# Patient Record
Sex: FEMALE | Race: OTHER | HISPANIC OR LATINO | URBAN - METROPOLITAN AREA
[De-identification: names, ages, dates, MRNs, and addresses within clinical notes are randomized per-mention and may not be internally consistent; named-entity substitution may affect disease eponyms.]

---

## 2018-05-04 ENCOUNTER — OUTPATIENT (OUTPATIENT)
Dept: OUTPATIENT SERVICES | Facility: HOSPITAL | Age: 63
LOS: 1 days | End: 2018-05-04
Payer: COMMERCIAL

## 2018-05-04 PROCEDURE — 71046 X-RAY EXAM CHEST 2 VIEWS: CPT | Mod: 26

## 2018-05-04 PROCEDURE — 71046 X-RAY EXAM CHEST 2 VIEWS: CPT

## 2023-01-02 ENCOUNTER — EMERGENCY (EMERGENCY)
Facility: HOSPITAL | Age: 68
LOS: 1 days | Discharge: HOME | End: 2023-01-02
Attending: EMERGENCY MEDICINE | Admitting: EMERGENCY MEDICINE
Payer: COMMERCIAL

## 2023-01-02 VITALS
SYSTOLIC BLOOD PRESSURE: 97 MMHG | RESPIRATION RATE: 18 BRPM | TEMPERATURE: 98 F | HEART RATE: 89 BPM | HEIGHT: 61 IN | OXYGEN SATURATION: 95 % | DIASTOLIC BLOOD PRESSURE: 62 MMHG | WEIGHT: 119.93 LBS

## 2023-01-02 VITALS
HEART RATE: 77 BPM | OXYGEN SATURATION: 94 % | DIASTOLIC BLOOD PRESSURE: 77 MMHG | RESPIRATION RATE: 17 BRPM | TEMPERATURE: 98 F | SYSTOLIC BLOOD PRESSURE: 130 MMHG

## 2023-01-02 LAB
ALBUMIN SERPL ELPH-MCNC: 3.5 G/DL — SIGNIFICANT CHANGE UP (ref 3.3–5)
ALP SERPL-CCNC: 107 U/L — SIGNIFICANT CHANGE UP (ref 40–120)
ALT FLD-CCNC: 8 U/L — LOW (ref 10–45)
ANION GAP SERPL CALC-SCNC: 11 MMOL/L — SIGNIFICANT CHANGE UP (ref 5–17)
AST SERPL-CCNC: 27 U/L — SIGNIFICANT CHANGE UP (ref 10–40)
BASOPHILS # BLD AUTO: 0.05 K/UL — SIGNIFICANT CHANGE UP (ref 0–0.2)
BASOPHILS NFR BLD AUTO: 0.5 % — SIGNIFICANT CHANGE UP (ref 0–2)
BILIRUB SERPL-MCNC: 0.5 MG/DL — SIGNIFICANT CHANGE UP (ref 0.2–1.2)
BUN SERPL-MCNC: 10 MG/DL — SIGNIFICANT CHANGE UP (ref 7–23)
CALCIUM SERPL-MCNC: 8.8 MG/DL — SIGNIFICANT CHANGE UP (ref 8.4–10.5)
CHLORIDE SERPL-SCNC: 102 MMOL/L — SIGNIFICANT CHANGE UP (ref 96–108)
CO2 SERPL-SCNC: 28 MMOL/L — SIGNIFICANT CHANGE UP (ref 22–31)
CREAT SERPL-MCNC: 0.66 MG/DL — SIGNIFICANT CHANGE UP (ref 0.5–1.3)
EGFR: 95 ML/MIN/1.73M2 — SIGNIFICANT CHANGE UP
EOSINOPHIL # BLD AUTO: 0.04 K/UL — SIGNIFICANT CHANGE UP (ref 0–0.5)
EOSINOPHIL NFR BLD AUTO: 0.4 % — SIGNIFICANT CHANGE UP (ref 0–6)
GLUCOSE SERPL-MCNC: 99 MG/DL — SIGNIFICANT CHANGE UP (ref 70–99)
HCT VFR BLD CALC: 44.3 % — SIGNIFICANT CHANGE UP (ref 34.5–45)
HGB BLD-MCNC: 14.8 G/DL — SIGNIFICANT CHANGE UP (ref 11.5–15.5)
IMM GRANULOCYTES NFR BLD AUTO: 0.2 % — SIGNIFICANT CHANGE UP (ref 0–0.9)
LYMPHOCYTES # BLD AUTO: 1.68 K/UL — SIGNIFICANT CHANGE UP (ref 1–3.3)
LYMPHOCYTES # BLD AUTO: 17.5 % — SIGNIFICANT CHANGE UP (ref 13–44)
MCHC RBC-ENTMCNC: 33.4 GM/DL — SIGNIFICANT CHANGE UP (ref 32–36)
MCHC RBC-ENTMCNC: 33.8 PG — SIGNIFICANT CHANGE UP (ref 27–34)
MCV RBC AUTO: 101.1 FL — HIGH (ref 80–100)
MONOCYTES # BLD AUTO: 0.4 K/UL — SIGNIFICANT CHANGE UP (ref 0–0.9)
MONOCYTES NFR BLD AUTO: 4.2 % — SIGNIFICANT CHANGE UP (ref 2–14)
NEUTROPHILS # BLD AUTO: 7.42 K/UL — HIGH (ref 1.8–7.4)
NEUTROPHILS NFR BLD AUTO: 77.2 % — HIGH (ref 43–77)
NRBC # BLD: 0 /100 WBCS — SIGNIFICANT CHANGE UP (ref 0–0)
PLATELET # BLD AUTO: 206 K/UL — SIGNIFICANT CHANGE UP (ref 150–400)
POTASSIUM SERPL-MCNC: 4.2 MMOL/L — SIGNIFICANT CHANGE UP (ref 3.5–5.3)
POTASSIUM SERPL-SCNC: 4.2 MMOL/L — SIGNIFICANT CHANGE UP (ref 3.5–5.3)
PROT SERPL-MCNC: 7.1 G/DL — SIGNIFICANT CHANGE UP (ref 6–8.3)
RBC # BLD: 4.38 M/UL — SIGNIFICANT CHANGE UP (ref 3.8–5.2)
RBC # FLD: 14.1 % — SIGNIFICANT CHANGE UP (ref 10.3–14.5)
SARS-COV-2 RNA SPEC QL NAA+PROBE: NEGATIVE — SIGNIFICANT CHANGE UP
SODIUM SERPL-SCNC: 141 MMOL/L — SIGNIFICANT CHANGE UP (ref 135–145)
TROPONIN T SERPL-MCNC: 0.01 NG/ML — SIGNIFICANT CHANGE UP (ref 0–0.01)
WBC # BLD: 9.61 K/UL — SIGNIFICANT CHANGE UP (ref 3.8–10.5)
WBC # FLD AUTO: 9.61 K/UL — SIGNIFICANT CHANGE UP (ref 3.8–10.5)

## 2023-01-02 PROCEDURE — 99285 EMERGENCY DEPT VISIT HI MDM: CPT | Mod: 25

## 2023-01-02 PROCEDURE — 85025 COMPLETE CBC W/AUTO DIFF WBC: CPT

## 2023-01-02 PROCEDURE — 29130 APPL FINGER SPLINT STATIC: CPT

## 2023-01-02 PROCEDURE — 70480 CT ORBIT/EAR/FOSSA W/O DYE: CPT | Mod: 26,59,MA

## 2023-01-02 PROCEDURE — 29130 APPL FINGER SPLINT STATIC: CPT | Mod: RT

## 2023-01-02 PROCEDURE — 73130 X-RAY EXAM OF HAND: CPT

## 2023-01-02 PROCEDURE — 80053 COMPREHEN METABOLIC PANEL: CPT

## 2023-01-02 PROCEDURE — 84484 ASSAY OF TROPONIN QUANT: CPT

## 2023-01-02 PROCEDURE — 70480 CT ORBIT/EAR/FOSSA W/O DYE: CPT | Mod: MA

## 2023-01-02 PROCEDURE — 70450 CT HEAD/BRAIN W/O DYE: CPT | Mod: 26,MA

## 2023-01-02 PROCEDURE — 73130 X-RAY EXAM OF HAND: CPT | Mod: 26,RT

## 2023-01-02 PROCEDURE — 93005 ELECTROCARDIOGRAM TRACING: CPT

## 2023-01-02 PROCEDURE — 70450 CT HEAD/BRAIN W/O DYE: CPT | Mod: MA

## 2023-01-02 PROCEDURE — 87635 SARS-COV-2 COVID-19 AMP PRB: CPT

## 2023-01-02 PROCEDURE — 36415 COLL VENOUS BLD VENIPUNCTURE: CPT

## 2023-01-02 RX ORDER — SODIUM CHLORIDE 9 MG/ML
1000 INJECTION INTRAMUSCULAR; INTRAVENOUS; SUBCUTANEOUS ONCE
Refills: 0 | Status: COMPLETED | OUTPATIENT
Start: 2023-01-02 | End: 2023-01-02

## 2023-01-02 RX ORDER — ACETAMINOPHEN 500 MG
650 TABLET ORAL ONCE
Refills: 0 | Status: COMPLETED | OUTPATIENT
Start: 2023-01-02 | End: 2023-01-02

## 2023-01-02 RX ADMIN — SODIUM CHLORIDE 1000 MILLILITER(S): 9 INJECTION INTRAMUSCULAR; INTRAVENOUS; SUBCUTANEOUS at 14:54

## 2023-01-02 RX ADMIN — Medication 650 MILLIGRAM(S): at 14:50

## 2023-01-02 NOTE — ED PROVIDER NOTE - CLINICAL SUMMARY MEDICAL DECISION MAKING FREE TEXT BOX
67F denies PMH p/w LOC/pain. Pt states that 2-3d ago (unsure exact day) she was standing waiting for elevator and next thing she remembers is waking up on ground w/ people around her (unsure of duration LOC). Since then she has swelling/mild pain to R periorbital region as well as to R hand (4th/5th fingers). Initially went to  but no XR available. Came to ED today at persistence of family members (pt's mother reportedly  from head trauma). No other systemic symptoms.   Borderline BP, other vitals wnl. Exam as above.  ddx: Likely syncopal event a few days ago - likely benign syncope. Subsequent minor head trauma and hand trauma. Low suspicion ICH/facial fx. Possible hand fx.   Clinically not ACS, PE, tamponade, dissection, PTX, perf, myocarditis, mediastinitis.   EKG, labs, CT, XR, symptom control, IVF, reassess.

## 2023-01-02 NOTE — ED PROVIDER NOTE - PATIENT PORTAL LINK FT
You can access the FollowMyHealth Patient Portal offered by [facility] by registering at the following website: [email or web address]. By joining [employer]’s FollowMyHealth portal, you will also be able to view your health information using other applications (apps) compatible with our system.

## 2023-01-02 NOTE — ED PROVIDER NOTE - CARE PLAN
1 Principal Discharge DX:	Syncope  Secondary Diagnosis:	Head trauma  Secondary Diagnosis:	Facial trauma  Secondary Diagnosis:	Finger fracture

## 2023-01-02 NOTE — ED ADULT NURSE NOTE - OBJECTIVE STATEMENT
67F presents c/o right eye and right hand pain s/p fall on friday. per patient, she does not remember anything before the fall so cannot say if it was a syncopal episode or not. pt speaking in clear, complete sentences. at this time she denies any visual changes, CP/SOB, n/v/d, fevers/chills, abd pain. pt ambulatory with steady gait. +sensation and +pulses to right hand. no blood thinner use.

## 2023-01-02 NOTE — ED PROVIDER NOTE - PROGRESS NOTE DETAILS
[place]: Labs grossly wnl. CTs showing "Mild right periorbital soft tissue swelling. No acute fracture. Old left medial orbital wall fracture."   XR w/ minimally displaced fx of 5th proximal phalanx. Splinted hand.   Discussed all results with patient, including any incidental radiological findings and lab abnormalities. Given copy of results and instructed to bring copy to primary doctor.   Discussed importance of outpt follow up and return precautions. Clinically no indication for further emergent ED workup or hospitalization at this time. Stable for dc, outpt f/u.

## 2023-01-02 NOTE — ED PROVIDER NOTE - NSFOLLOWUPINSTRUCTIONS_ED_ALL_ED_FT
Can take tylenol 650mg every 6hrs as needed for mild pain.    Stay well hydrated.    Return for fevers, persistent vomit, uncontrolled pain, worsening breathing, worsening lightheaded.    Follow up with primary doctor within 1-2 days.     Follow up with cardiologist for further evaluation of your episode of passing out. Can call [phone] ([facility]) to schedule appointment.     Keep arm slightly elevated when possible.   Follow up with orthopedist for further management of your broken finger. Can call [phone] to schedule appointment.     Fracture    A fracture is a break in one of your bones. This can occur from a variety of injuries, especially traumatic ones. Symptoms include pain, bruising, or swelling. Do not use the injured limb. If a fracture is in one of the bones below your waist, do not put weight on that limb unless instructed to do so by your healthcare provider. Crutches or a cane may have been provided. A splint or cast may have been applied by your health care provider. Make sure to keep it dry and follow up with an orthopedist as instructed.    SEEK IMMEDIATE MEDICAL CARE IF YOU HAVE ANY OF THE FOLLOWING SYMPTOMS: numbness, tingling, increasing pain, or weakness in any part of the injured limb.     Syncope    Syncope refers to a condition in which a person temporarily loses consciousness. Syncope may also be called fainting or passing out. It is caused by a sudden decrease in blood flow to the brain. Even though most causes of syncope are not dangerous, syncope can be a sign of a serious medical problem. Your health care provider may do tests to find the reason why you are having syncope.    Signs that you may be about to faint include:  Feeling dizzy or light-headed.  Feeling nauseous.  Seeing all white or all black in your field of vision.  Having cold, clammy skin.  If you faint, get medical help right away.   Call your local emergency services (911 in the [place] Do not drive yourself to the hospital.    Follow these instructions at home:  Pay attention to any changes in your symptoms. Take these actions to stay safe and to help relieve your symptoms:    Lifestyle     Do not drive, use machinery, or play sports until your health care provider says it is okay. Do not drink alcohol. Do not use any products that contain nicotine or tobacco, such as cigarettes and e-cigarettes. If you need help quitting, ask your health care provider. Drink enough fluid to keep your urine pale yellow.    General instructions     Take over-the-counter and prescription medicines only as told by your health care provider. If you are taking blood pressure or heart medicine, get up slowly and take several minutes to sit and then stand. This can reduce dizziness or light-headedness. Have someone stay with you until you feel stable. If you start to feel like you might faint, lie down right away and raise (elevate) your feet above the level of your heart. Breathe deeply and steadily. Wait until all the symptoms have passed. Keep all follow-up visits as told by your health care provider. This is important.   Get help right away if you:  Have a severe headache.  Faint once or repeatedly.  Have pain in your chest, abdomen, or back.  Have a very fast or irregular heartbeat (palpitations).  Have pain when you breathe.  Are bleeding from your mouth or rectum, or you have black or tarry stool.  Have a seizure.  Are confused.  Have trouble walking.  Have severe weakness.  Have vision problems.  These symptoms may represent a serious problem that is an emergency. Do not wait to see if your symptoms will go away. Get medical help right away. Call your local emergency services (911 in the [place] Do not drive yourself to the hospital.      Follow up with cardiologist. Can call [phone] ([facility]) to schedule appointment.    Can also call the following offices:    Dr. Mary Mueller, Dr. Antoni Dinero  [phone]  [address]  [place], [place]    Dr. Mauro Lee  [phone]  [address], [address]  [place], [place]    Dr. Migel Palmer  [phone]    [address], [address] [place], [place]  [phone]  Dr. Chaitanya Hanley, Dr. Molly Mccord, Dr. Dora Rodríguez, Dr. Chaitanya Watkins, Dr. Crispin Hansen, Dr. Jarocho Mead, Dr. Jessica Ledesma, Dr. Mauro Lee    [address], [address]  [place], [place]  [phone]  Dr. Dario Vizcaino, Dr. Jeimy Key, Dr. Jasmin Duque, Dr. Migel Palmer, Dr. Sofiya Casillas, Dr. Andrew Angeles, Dr. Mansoor Mai    [address] [place], [place]  [phone]  Dr. Fred Angeles, Dr. Dennis Hunter, Dr. Jonathan Loaiza, Dr. Jarocho Mullen, Dr. Antoni Dinero, Dr. Abena Rubio    [address], [address] [facility]  [phone]  Dr. Carlyle Novak, Dr. Cristine Foreman, Dr. Manuelito Sal, Dr. Jasmin Duque, Dr. Ken Mas, Dr. Andrew Angeles, Dr. Lori Patel, Dr. Allen Spencer, Dr. Edel Paige    [phone]  Dr. Jonah Metz    [phone]  Dr. Kayla Castellano, Dr. Jarocho Mead    [address]  [place], [place]  [phone]  Dr. Mary Mueller    [phone]  Dr. Martina Payne, Dr. Davian Ho, Dr. Juan Luis Ken, Dr. Jack Rueda, Dr. Blair Choi, Dr. Esa Fourneir, Dr. Sofiya Potter, Dr. Richie Henry, Dr. Jessica Ledesma    [phone]  Dr. Heath Odom, Dr. Allen Spencer, Dr. Edel Paige    [address] [facility], [address]  [place], [place]  [phone]  Dr. Jsoe R Romo, Dr. Jey Stephen, Dr. Henrique Seymour, Dr. Ok Austin    [address] [place], [place]  [phone]  Dr. Jose R Romo    [address]  [place], [place]  [phone]  Dr. Jarocho Mead    [address]  [place], [place]  [phone]  Dr. Henrique Seymour    [address]  [place], [place]  [phone]  Dr. Mansoor Mai    [address] [place], [place]  [phone]  Dr. Jarocho Mead    [address]  [place], [place]  [phone]  Dr. Chaitanya Hanley    [address]  [place], [place]  [phone]  Dr. Jose R Romo, Dr. Jey Stephen, Dr. Henrique Seymour, Dr. Ok Austin    [address] [facility], [address] [place], [place]  [phone]  Dr. Antoni Dinero    [address] [place] [address], [address] [place], [phone]  Dr. Manuelito Sal, Dr. Jasmin Duque, Dr. Bishnu Garcia    [address] [place], [place]  [phone]  Dr. Jose R Romo, Dr. Jey Stephen, Dr. Henrique Seymour, Dr. Ok Austin

## 2023-01-02 NOTE — ED PROVIDER NOTE - OBJECTIVE STATEMENT
67F denies PMH p/w LOC/pain. Pt states that 2-3d ago (unsure exact day) she was standing waiting for elevator and next thing she remembers is waking up on ground w/ people around her (unsure of duration LOC). Since then she has swelling/mild pain to R periorbital region as well as to R hand (4th/5th fingers). Initially went to  but no XR available. Came to ED today at persistence of family members (pt's mother reportedly  from head trauma). No other systemic symptoms.   R handed  Pt attributes passing out to possibly drinking etoh in prep for New Years (drinks very sporadically). Has not had any lightheadedness/dizziness since the fall, ambulating normally. Denies prior syncope.   Denies pain to eye itself, vision changes, mouth/tooth pain (tolerating normal PO) focal weakness, focal numbness, neck pain, back pain, SOB, CP, other HA, abd pain, nausea, vomiting, diarrhea, black stool, bloody stool, urinary complaints, URI symptoms. Denies other MSK pain. Denies recent illnesses or medication changes (not on any daily meds). Not on AC.

## 2023-01-02 NOTE — ED PROVIDER NOTE - AXIS
Chivo Mirza  [id number]  06/[birth year]20  PROCEDURE NOTE: LACERATION REPAIR  LOCATION: nasal bridge  SIZE:  3cm  COMPLEXITY:  Simple       Informed consent, after discussion of the risks, benefits, and alternatives to the procedure,  was obtained verbally from the patient prior to procedure. A timeout to verify the correct patient, procedure, and site was performed immediately prior to the procedure  The patient was identified using two patient identifiers: Yes. The H&P along with required diagnostics are available in Epic: Yes  The correct procedure was verified: Yes  Procedural site identified: Yes  Presence of required equipment verified prior to starting procedure: Yes  Patient allergies identified or reviewed: Yes  Site marking done: Not indicated     The laceration was cleaned with Betadine, irrigated with normal saline and scrubbed. The area was prepped and draped in the usual sterile fashion. Anesthesia was obtained by local infiltration of 3mL of lidocaine 1% with epinephrine. The wound was explored and no foreign body was noted. Hemostasis was achieved. Laceration was linear shaped, and involved the skin and subcutaneous tissue. The site was then repaired using 5-0 nylon suture in a simple interrupted fashion. 5 sutures were placed. The wound was dressed with bacitracin. The patient tolerated the procedure well. The patient was instructed to care for the wound by bacitracin application. The sutures will need to come out on 7/1/2020 in trauma clinic or PCP or urgent care.       Beck Lofton Normal

## 2023-01-02 NOTE — ED PROVIDER NOTE - PHYSICAL EXAMINATION
Mild swelling/ecchymosis and ttp to R 5th MTP region, mild ecchymosis/swelling to 4th and 5th fingers w/ decreased ROM of MTP joints.   Mild edema/ttp/ecchymosis to R periorbital region.  No trismus. Jaw FROM. No other facial swelling. Normal voice. No stridor/drooling. No other facial bony ttp. No bleeding or obvious skin breaks. PERRL, EOMI, no nystagmus. No proptosis. Ears symmetrical. No mastoid ttp. Neck FROM. No nuchal rigidity. No pain w/ ear manipulation.   No spinal ttp, neck FROM. Strength 5/5. No other bony ttp, FROM all other extremities. Normal equal distal pulses. Steady unassisted gait.

## 2023-01-02 NOTE — ED PROVIDER NOTE - CARE PROVIDER_API CALL
Juanito Funk)  Orthopedics  [facility]  [address], [address] [place] [address]  Phone: [phone]  Fax: [phone])-  Follow Up Time:     Jair Clay)  Plastic Surgery; Surgery of the Hand  [address], [address]  [place] [address]  Phone: [phone]  Fax: [phone]  Follow Up Time:

## 2023-01-05 DIAGNOSIS — S00.11XA CONTUSION OF RIGHT EYELID AND PERIOCULAR AREA, INITIAL ENCOUNTER: ICD-10-CM

## 2023-01-05 DIAGNOSIS — S02.832A FRACTURE OF MEDIAL ORBITAL WALL, LEFT SIDE, INITIAL ENCOUNTER FOR CLOSED FRACTURE: ICD-10-CM

## 2023-01-05 DIAGNOSIS — R55 SYNCOPE AND COLLAPSE: ICD-10-CM
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
## 2023-01-05 DIAGNOSIS — S09.90XA UNSPECIFIED INJURY OF HEAD, INITIAL ENCOUNTER: ICD-10-CM

## 2023-01-05 DIAGNOSIS — R40.4 TRANSIENT ALTERATION OF AWARENESS: ICD-10-CM

## 2023-01-05 DIAGNOSIS — S62.616A DISPLACED FRACTURE OF PROXIMAL PHALANX OF RIGHT LITTLE FINGER, INITIAL ENCOUNTER FOR CLOSED FRACTURE: ICD-10-CM
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
## 2023-01-05 DIAGNOSIS — Z20.822 CONTACT WITH AND (SUSPECTED) EXPOSURE TO COVID-19: ICD-10-CM

## 2023-07-18 NOTE — ED PROVIDER NOTE - NPI NUMBER (FOR SYSADMIN USE ONLY) :
on the discharge service for the patient. I have reviewed and made amendments to the documentation where necessary.
[[id number]]